# Patient Record
Sex: FEMALE | Race: WHITE | Employment: OTHER | URBAN - METROPOLITAN AREA
[De-identification: names, ages, dates, MRNs, and addresses within clinical notes are randomized per-mention and may not be internally consistent; named-entity substitution may affect disease eponyms.]

---

## 2018-04-20 ENCOUNTER — HOSPITAL ENCOUNTER (OUTPATIENT)
Facility: CLINIC | Age: 83
End: 2018-04-20
Attending: OPHTHALMOLOGY | Admitting: OPHTHALMOLOGY
Payer: MEDICARE

## 2018-04-20 RX ORDER — MOXIFLOXACIN 5 MG/ML
1 SOLUTION/ DROPS OPHTHALMIC
Status: CANCELLED | OUTPATIENT
Start: 2018-04-20

## 2018-04-20 RX ORDER — PHENYLEPHRINE HYDROCHLORIDE 25 MG/ML
1 SOLUTION/ DROPS OPHTHALMIC
Status: CANCELLED | OUTPATIENT
Start: 2018-04-20

## 2018-04-20 RX ORDER — PROPARACAINE HYDROCHLORIDE 5 MG/ML
1 SOLUTION/ DROPS OPHTHALMIC ONCE
Status: CANCELLED | OUTPATIENT
Start: 2018-04-20 | End: 2018-04-20

## 2018-04-20 RX ORDER — DICLOFENAC SODIUM 1 MG/ML
1 SOLUTION/ DROPS OPHTHALMIC
Status: CANCELLED | OUTPATIENT
Start: 2018-04-20

## 2018-04-20 RX ORDER — TROPICAMIDE 10 MG/ML
1 SOLUTION/ DROPS OPHTHALMIC
Status: CANCELLED | OUTPATIENT
Start: 2018-04-20

## 2018-04-24 ENCOUNTER — ANESTHESIA (OUTPATIENT)
Dept: SURGERY | Facility: CLINIC | Age: 83
End: 2018-04-24
Payer: MEDICARE

## 2018-04-24 ENCOUNTER — HOSPITAL ENCOUNTER (OUTPATIENT)
Facility: CLINIC | Age: 83
Discharge: HOME | End: 2018-04-24
Attending: OPHTHALMOLOGY | Admitting: OPHTHALMOLOGY
Payer: MEDICARE

## 2018-04-24 ENCOUNTER — ANESTHESIA EVENT (OUTPATIENT)
Dept: SURGERY | Facility: CLINIC | Age: 83
End: 2018-04-24
Payer: MEDICARE

## 2018-04-24 VITALS
OXYGEN SATURATION: 95 % | DIASTOLIC BLOOD PRESSURE: 85 MMHG | TEMPERATURE: 97.1 F | HEART RATE: 63 BPM | HEIGHT: 64 IN | RESPIRATION RATE: 16 BRPM | SYSTOLIC BLOOD PRESSURE: 115 MMHG | WEIGHT: 174.6 LBS | BODY MASS INDEX: 29.81 KG/M2

## 2018-04-24 LAB
ANION GAP SERPL CALCULATED.3IONS-SCNC: 9 MMOL/L (ref 3–14)
BUN SERPL-MCNC: 39 MG/DL (ref 7–30)
CALCIUM SERPL-MCNC: 9 MG/DL (ref 8.5–10.1)
CHLORIDE SERPL-SCNC: 106 MMOL/L (ref 94–109)
CO2 SERPL-SCNC: 25 MMOL/L (ref 20–32)
CREAT SERPL-MCNC: 2.24 MG/DL (ref 0.52–1.04)
GFR SERPL CREATININE-BSD FRML MDRD: 21 ML/MIN/1.7M2
GLUCOSE SERPL-MCNC: 108 MG/DL (ref 70–99)
POTASSIUM SERPL-SCNC: 4.8 MMOL/L (ref 3.4–5.3)
SODIUM SERPL-SCNC: 140 MMOL/L (ref 133–144)

## 2018-04-24 PROCEDURE — 36000102 ZZH EYE SURGERY LEVEL 3 EA 15 ADDTL MIN: Performed by: OPHTHALMOLOGY

## 2018-04-24 PROCEDURE — 37000009 ZZH ANESTHESIA TECHNICAL FEE, EACH ADDTL 15 MIN: Performed by: OPHTHALMOLOGY

## 2018-04-24 PROCEDURE — 40000170 ZZH STATISTIC PRE-PROCEDURE ASSESSMENT II: Performed by: OPHTHALMOLOGY

## 2018-04-24 PROCEDURE — 25000128 H RX IP 250 OP 636: Performed by: OPHTHALMOLOGY

## 2018-04-24 PROCEDURE — 80048 BASIC METABOLIC PNL TOTAL CA: CPT | Performed by: ANESTHESIOLOGY

## 2018-04-24 PROCEDURE — 25000125 ZZHC RX 250: Performed by: OPHTHALMOLOGY

## 2018-04-24 PROCEDURE — 25000125 ZZHC RX 250: Performed by: ANESTHESIOLOGY

## 2018-04-24 PROCEDURE — V2632 POST CHMBR INTRAOCULAR LENS: HCPCS | Performed by: OPHTHALMOLOGY

## 2018-04-24 PROCEDURE — 27210794 ZZH OR GENERAL SUPPLY STERILE: Performed by: OPHTHALMOLOGY

## 2018-04-24 PROCEDURE — 40000065 ZZH STATISTIC EKG NON-CHARGEABLE

## 2018-04-24 PROCEDURE — 93010 ELECTROCARDIOGRAM REPORT: CPT | Performed by: INTERNAL MEDICINE

## 2018-04-24 PROCEDURE — 25000128 H RX IP 250 OP 636: Performed by: NURSE ANESTHETIST, CERTIFIED REGISTERED

## 2018-04-24 PROCEDURE — 37000008 ZZH ANESTHESIA TECHNICAL FEE, 1ST 30 MIN: Performed by: OPHTHALMOLOGY

## 2018-04-24 PROCEDURE — 71000028 ZZH EYE RECOVERY PHASE 2 EACH 15 MINS: Performed by: OPHTHALMOLOGY

## 2018-04-24 PROCEDURE — 25000128 H RX IP 250 OP 636: Performed by: ANESTHESIOLOGY

## 2018-04-24 PROCEDURE — 36000101 ZZH EYE SURGERY LEVEL 3 1ST 30 MIN: Performed by: OPHTHALMOLOGY

## 2018-04-24 PROCEDURE — 93005 ELECTROCARDIOGRAM TRACING: CPT

## 2018-04-24 PROCEDURE — 36415 COLL VENOUS BLD VENIPUNCTURE: CPT | Performed by: ANESTHESIOLOGY

## 2018-04-24 DEVICE — EYE IMP IOL AMO PCL TECNIS ZCB00 22.0: Type: IMPLANTABLE DEVICE | Site: EYE | Status: FUNCTIONAL

## 2018-04-24 RX ORDER — ONDANSETRON 2 MG/ML
INJECTION INTRAMUSCULAR; INTRAVENOUS PRN
Status: DISCONTINUED | OUTPATIENT
Start: 2018-04-24 | End: 2018-04-24

## 2018-04-24 RX ORDER — DICLOFENAC SODIUM 1 MG/ML
1 SOLUTION/ DROPS OPHTHALMIC
Status: COMPLETED | OUTPATIENT
Start: 2018-04-24 | End: 2018-04-24

## 2018-04-24 RX ORDER — BALANCED SALT SOLUTION 6.4; .75; .48; .3; 3.9; 1.7 MG/ML; MG/ML; MG/ML; MG/ML; MG/ML; MG/ML
SOLUTION OPHTHALMIC PRN
Status: DISCONTINUED | OUTPATIENT
Start: 2018-04-24 | End: 2018-04-24 | Stop reason: HOSPADM

## 2018-04-24 RX ORDER — PHENYLEPHRINE HYDROCHLORIDE 25 MG/ML
1 SOLUTION/ DROPS OPHTHALMIC
Status: COMPLETED | OUTPATIENT
Start: 2018-04-24 | End: 2018-04-24

## 2018-04-24 RX ORDER — MOXIFLOXACIN 5 MG/ML
1 SOLUTION/ DROPS OPHTHALMIC
Status: COMPLETED | OUTPATIENT
Start: 2018-04-24 | End: 2018-04-24

## 2018-04-24 RX ORDER — PROPARACAINE HYDROCHLORIDE 5 MG/ML
1 SOLUTION/ DROPS OPHTHALMIC ONCE
Status: COMPLETED | OUTPATIENT
Start: 2018-04-24 | End: 2018-04-24

## 2018-04-24 RX ORDER — SODIUM CHLORIDE, SODIUM LACTATE, POTASSIUM CHLORIDE, CALCIUM CHLORIDE 600; 310; 30; 20 MG/100ML; MG/100ML; MG/100ML; MG/100ML
500 INJECTION, SOLUTION INTRAVENOUS CONTINUOUS
Status: DISCONTINUED | OUTPATIENT
Start: 2018-04-24 | End: 2018-04-24 | Stop reason: HOSPADM

## 2018-04-24 RX ORDER — TETRACAINE HYDROCHLORIDE 5 MG/ML
SOLUTION OPHTHALMIC PRN
Status: DISCONTINUED | OUTPATIENT
Start: 2018-04-24 | End: 2018-04-24 | Stop reason: HOSPADM

## 2018-04-24 RX ORDER — TIMOLOL 5 MG/ML
SOLUTION/ DROPS OPHTHALMIC PRN
Status: DISCONTINUED | OUTPATIENT
Start: 2018-04-24 | End: 2018-04-24 | Stop reason: HOSPADM

## 2018-04-24 RX ORDER — TROPICAMIDE 10 MG/ML
1 SOLUTION/ DROPS OPHTHALMIC
Status: COMPLETED | OUTPATIENT
Start: 2018-04-24 | End: 2018-04-24

## 2018-04-24 RX ORDER — PROPARACAINE HYDROCHLORIDE 5 MG/ML
1 SOLUTION/ DROPS OPHTHALMIC ONCE
Status: DISCONTINUED | OUTPATIENT
Start: 2018-04-24 | End: 2018-04-24 | Stop reason: HOSPADM

## 2018-04-24 RX ORDER — LIDOCAINE HYDROCHLORIDE 10 MG/ML
INJECTION, SOLUTION EPIDURAL; INFILTRATION; INTRACAUDAL; PERINEURAL PRN
Status: DISCONTINUED | OUTPATIENT
Start: 2018-04-24 | End: 2018-04-24 | Stop reason: HOSPADM

## 2018-04-24 RX ADMIN — Medication 1 DROP: at 07:39

## 2018-04-24 RX ADMIN — PHENYLEPHRINE HYDROCHLORIDE 1 DROP: 2.5 SOLUTION/ DROPS OPHTHALMIC at 07:39

## 2018-04-24 RX ADMIN — PHENYLEPHRINE HYDROCHLORIDE 1 DROP: 2.5 SOLUTION/ DROPS OPHTHALMIC at 07:53

## 2018-04-24 RX ADMIN — MOXIFLOXACIN 1 DROP: 5 SOLUTION/ DROPS OPHTHALMIC at 07:39

## 2018-04-24 RX ADMIN — PHENYLEPHRINE HYDROCHLORIDE 1 DROP: 2.5 SOLUTION/ DROPS OPHTHALMIC at 08:22

## 2018-04-24 RX ADMIN — MOXIFLOXACIN 1 DROP: 5 SOLUTION/ DROPS OPHTHALMIC at 08:22

## 2018-04-24 RX ADMIN — DICLOFENAC SODIUM 1 DROP: 1 SOLUTION OPHTHALMIC at 07:54

## 2018-04-24 RX ADMIN — TROPICAMIDE 1 DROP: 10 SOLUTION/ DROPS OPHTHALMIC at 08:22

## 2018-04-24 RX ADMIN — DICLOFENAC SODIUM 1 DROP: 1 SOLUTION OPHTHALMIC at 08:22

## 2018-04-24 RX ADMIN — ONDANSETRON 4 MG: 2 INJECTION INTRAMUSCULAR; INTRAVENOUS at 09:01

## 2018-04-24 RX ADMIN — SODIUM CHLORIDE, POTASSIUM CHLORIDE, SODIUM LACTATE AND CALCIUM CHLORIDE 500 ML: 600; 310; 30; 20 INJECTION, SOLUTION INTRAVENOUS at 08:22

## 2018-04-24 RX ADMIN — LIDOCAINE HYDROCHLORIDE 0.5 ML: 10 INJECTION, SOLUTION EPIDURAL; INFILTRATION; INTRACAUDAL; PERINEURAL at 08:24

## 2018-04-24 RX ADMIN — PROPARACAINE HYDROCHLORIDE 1 DROP: 5 SOLUTION/ DROPS OPHTHALMIC at 07:38

## 2018-04-24 RX ADMIN — TROPICAMIDE 1 DROP: 10 SOLUTION/ DROPS OPHTHALMIC at 07:53

## 2018-04-24 RX ADMIN — MOXIFLOXACIN 1 DROP: 5 SOLUTION/ DROPS OPHTHALMIC at 07:53

## 2018-04-24 RX ADMIN — MIDAZOLAM 1 MG: 1 INJECTION INTRAMUSCULAR; INTRAVENOUS at 09:01

## 2018-04-24 RX ADMIN — DICLOFENAC SODIUM 1 DROP: 1 SOLUTION OPHTHALMIC at 07:39

## 2018-04-24 RX ADMIN — TROPICAMIDE 1 DROP: 10 SOLUTION/ DROPS OPHTHALMIC at 07:39

## 2018-04-24 ASSESSMENT — LIFESTYLE VARIABLES: TOBACCO_USE: 0

## 2018-04-24 ASSESSMENT — COPD QUESTIONNAIRES: COPD: 0

## 2018-04-24 ASSESSMENT — ENCOUNTER SYMPTOMS
DYSRHYTHMIAS: 0
SEIZURES: 0

## 2018-04-24 NOTE — DISCHARGE INSTRUCTIONS
[facility] Discharge  Instructions  Anesthesia ([facility])   Adult Discharge Instructions    For 24 hours after surgery    1. Get plenty of rest.  Make arrangements to have a responsible adult stay with you for at least 6 hours after you leave the hospital.  2. Do not drive or use heavy equipment for 24 hours.    3. Do not drink alcohol for 24 hours.  4. Do not sign legal documents or make important decisions for 24 hours.  5. Avoid strenuous or risky activities. You may feel lightheaded.  If so, sit for a few minutes before standing.  Have someone help you get up.   6. Conscious sedation patients may resume a regular diet..  7. Any questions of medical nature, call your physician.    Post operative Instructions for Cataract Surgery  [facility]  Dr. Briceno  [phone]      ACTIVITY    We encourage you to rest today, and then gradually resume your normal activity over the next 7-10 days.    You may be a little a drowsy and have trouble with depth perception so be careful today.  Avoid lifting anything over 20-30 pounds for 2 weeks.  Avoid swimming and excess water in eyes for 2 weeks.  Avoid driving for 24 after hours after surgery.    DIET AND MEDICATION  Resume your normal diet and medication.  Avoid alcohol within 24 hours after surgery.  Take extra strength Tylenol for pain.  For severe pain not helped by Tylenol, please call the office immediately.  (Diabetics: We recommend that you check your blood sugars more frequently the day of surgery).    EYE CARE  It is normal to feel scratchiness like something is in the eye following surgery.  It is important to protect your eye against vigorous rubbing and to wear your shield while sleeping for I week.  You may take baths/showers the day after, but avoid getting water in the eye.        EYE DROPS     One drop 4 times day. Start today noon, evening, bedtimePost operative Instructions for Cataract Surgery  [facility]  Dr. Briceno  [phone]      ACTIVITY    We encourage you to rest today, and then gradually resume your normal activity over the next 7-10 days.    You may be a little a drowsy and have trouble with depth perception so be careful today.  Avoid lifting anything over 20-30 pounds for 2 weeks.  Avoid swimming and excess water in eyes for 2 weeks.  Avoid driving for 24 after hours after surgery.    DIET AND MEDICATION  Resume your normal diet and medication.  Avoid alcohol within 24 hours after surgery.  Take extra strength Tylenol for pain.  For severe pain not helped by Tylenol, please call the office immediately.  (Diabetics: We recommend that you check your blood sugars more frequently the day of surgery).    EYE CARE  It is normal to feel scratchiness like something is in the eye following surgery.  It is important to protect your eye against vigorous rubbing and to wear your shield while sleeping for I week.  You may take baths/showers the day after, but avoid getting water in the eye.        EYE DROPS    Drop Day of Surgery Week 1 Starting Day After Surgery Weeks 2-4 After Surgery   Vigamox One drop  every 2 hours  while awake One drop  3 times a day One drop  3 times a day  Stop after 10 days   Ilevro One drop  1 time a day One drop  1 time a day One drop  1 time a day  until gone   Durezol One drop  1 time a day One drop  1 time a day One drop  1 time a day  until gone   *Please wait at least 5 minutes between instilling each eye drop    IN CASE OF EMERGENCY  If you notice a decrease in vision, or an increase in pain or redness, please call [facility] [phone]. After business hours, this number will reach the answering service to page the ophthalmologist on call.          2/25/14    Drop Day of Surgery Week 1 Starting Day After Surgery Weeks 2-4 After Surgery                         IN CASE OF EMERGENCY  If you notice a decrease in vision, or an increase in pain or redness, please  call [facility] [phone]. After business hours, this number will reach the answering service to page the ophthalmologist on call.          2/25/14

## 2018-04-24 NOTE — IP AVS SNAPSHOT
[facility]    [address]    [place] [address]    Phone:  [phone]    Fax:  [phone]                                       After Visit Summary   4/24/2018    Martita Alonso    MRN: [id number]           After Visit Summary Signature Page     I have received my discharge instructions, and my questions have been answered. I have discussed any challenges I see with this plan with the nurse or doctor.    ..........................................................................................................................................  Patient/Patient Representative Signature      ..........................................................................................................................................  Patient Representative Print Name and Relationship to Patient    ..................................................               ................................................  Date                                            Time    ..........................................................................................................................................  Reviewed by Signature/Title    ...................................................              ..............................................  Date                                                            Time

## 2018-04-24 NOTE — ANESTHESIA CARE TRANSFER NOTE
Patient: Martita Alonso    Procedure(s):  RIGHT EYE PHACOEMULSIFICATION CLEAR CORNEA WITH STANDARD INTRAOCULAR LENS IMPLANT  - Wound Class: I-Clean    Diagnosis: NUCLEAR CATARACT RIGHT EYE  Diagnosis Additional Information: No value filed.    Anesthesia Type:   MAC     Note:  Airway :Room Air  Patient transferred to:PACU  Comments: Transferred to [facility] Center recovery room in recliner with armrests up, spontaneous respirations, O2 saturation maintained greater than 98% with oxygen via room air. All monitors and alarms on and functioning, clinically stable vital signs. Report given to recovery RN and questions answered. Patient alert and following verbal directions.Handoff Report: Identifed the Patient, Identified the Reponsible Provider, Reviewed the pertinent medical history, Discussed the surgical course, Reviewed Intra-OP anesthesia mangement and issues during anesthesia, Set expectations for post-procedure period and Allowed opportunity for questions and acknowledgement of understanding      Vitals: (Last set prior to Anesthesia Care Transfer)    CRNA VITALS  4/24/2018 0858 - 4/24/2018 0932      4/24/2018             Resp Rate (set): 10                Electronically Signed By: CARMEN Willson CRNA  April 24, 2018  9:32 AM

## 2018-04-24 NOTE — ANESTHESIA POSTPROCEDURE EVALUATION
Patient: Martita Alonso    Procedure(s):  RIGHT EYE PHACOEMULSIFICATION CLEAR CORNEA WITH STANDARD INTRAOCULAR LENS IMPLANT  - Wound Class: I-Clean    Diagnosis:NUCLEAR CATARACT RIGHT EYE  Diagnosis Additional Information: No value filed.    Anesthesia Type:  MAC    Note:  Anesthesia Post Evaluation    Patient location during evaluation: PACU  Patient participation: Able to fully participate in evaluation  Level of consciousness: awake  Pain management: adequate  Airway patency: patent  Cardiovascular status: acceptable  Respiratory status: acceptable  Hydration status: acceptable  PONV: none     Anesthetic complications: None          Last vitals:  Vitals:    04/24/18 0805 04/24/18 0932 04/24/18 0948   BP: 125/58 123/54 115/85   Pulse: 63     Resp:  16 16   Temp: 36.2  C (97.1  F)     SpO2: 100% 96% 95%         Electronically Signed By: Tal Rai MD  April 24, 2018  2:05 PM

## 2018-04-24 NOTE — OP NOTE
Procedure Date: 04/24/2018      DATE OF PROCEDURE:   04/24/2018        PREOPERATIVE DIAGNOSIS:  Cataract:  Nuclear sclerosis, posterior subcapsular, cortical right eye.         POSTOPERATIVE DIAGNOSIS:  Cataract:  Nuclear sclerosis, posterior subcapsular, cortical right eye.        OPERATIVE PROCEDURE:  Phaco with IOL, right eye.         ANESTHESIA: Topical with MAC      LENS INFORMATION:  ZCB00 22.0 diopters.  Serial #[id number].           INDICATIONS FOR PROCEDURE:  A complete ophthalmic evaluation showed that the cataract was a significant cause of the decreased visual acuity.   The risks of cataract surgery including loss of vision, loss of the eye, hemorrhage, infection and other rare but possible side effects of surgery were discussed with the patient. The patient understood the risks and elected to proceed with surgery.       DESCRIPTION:  The patient's operative eye was dilated per the phacoemulsification standing orders.  The operative eye was anesthetized with 2% Lidocaine jelly 5 to 10 minutes before the surgical prep. The patient was then brought to the operating room where the operative eye was prepped and draped in the usual sterile fashion and a lid speculum was placed.  A grooved clear corneal incision was made at the temporal limbus of the eye.  This was 2.5 mm in length, 1/3 corneal depth.  A paracentesis was made at the 12 o'clock position.  The anterior chamber was entered though the initial corneal groove with a keratome. Viscoelastic was used to deepen the anterior chamber.  A capsulotomy was performed with the cystotome employing the capsulorrhexis technique.  BSS was used to hydrodissect and hydrodelineate the nucleus.  The nucleus was then carefully emulsified using the crack and fracture technique.  The remaining nuclear fragments were carefully emulsified and aspirated.  The remaining cortex was carefully aspirated from the posterior capsular bag with the I and A handpiece.        Viscoelastic was used to re-inflate the posterior capsular bag and a lens was carefully inserted through the incision and deposited into the bag with the haptics clearly within the bag.  The style and diopter of lens is listed on the implant label below.  Remaining viscoelastic was carefully aspirated from the anterior chamber.  The wound was closed by hydrating the cornea with BSS.  The wound was found to be water tight.  One drop each of  Vigamox and Combigan was given at the conclusion of the procedure.  There were no complications.  The patient tolerated the procedure well.         MARLENE RAMACHANDRAN MD             D: 2018   T: 2018   MT: LATRICIA      Name:     CINDY MARIE   MRN:      [id number]        Account:        [id number]   :      1935           Procedure Date: 2018      Document: [id number]

## 2018-04-24 NOTE — IP AVS SNAPSHOT
MRN:[id number]                      After Visit Summary   4/24/2018    Martita Alonso    MRN: [id number]           Thank you!     Thank you for choosing [facility] for your care. Our goal is always to provide you with excellent care. Hearing back from our patients is one way we can continue to improve our services. Please take a few minutes to complete the written survey that you may receive in the mail after you visit with us. Thank you!        Patient Information     Date Of Birth          1935        About your hospital stay     You were admitted on:  April 24, 2018 You last received care in the:  [facility]    You were discharged on:  April 24, 2018       Who to Call     For medical emergencies, please call 911.  For non-urgent questions about your medical care, please call your primary care provider or clinic, [phone]  For questions related to your surgery, please call your surgery clinic        Attending Provider     Provider Specialty    Bello Briceno MD Ophthalmology       Primary Care Provider Office Phone # Fax #    Fabio Galdamez [phone]      Your next 10 appointments already scheduled     May 22, 2018   Procedure with Bello Briceno MD   [facility] (--)    [address] [place] [address] [place], [address]  [facility] [address] [phone]              Further instructions from your care team       [facility] Discharge  Instructions  Anesthesia ([facility])   Adult Discharge Instructions    For 24 hours after surgery    1. Get plenty of rest.  Make arrangements to have a responsible adult stay with you for at least 6 hours after you leave the hospital.  2. Do not drive or use heavy equipment for 24 hours.    3. Do not drink alcohol for 24 hours.  4. Do not sign legal documents or make important decisions for 24 hours.  5. Avoid strenuous or risky activities. You may feel  lightheaded.  If so, sit for a few minutes before standing.  Have someone help you get up.   6. Conscious sedation patients may resume a regular diet..  7. Any questions of medical nature, call your physician.    Post operative Instructions for Cataract Surgery  [facility]  Dr. Briceno  [phone]      ACTIVITY    We encourage you to rest today, and then gradually resume your normal activity over the next 7-10 days.    You may be a little a drowsy and have trouble with depth perception so be careful today.  Avoid lifting anything over 20-30 pounds for 2 weeks.  Avoid swimming and excess water in eyes for 2 weeks.  Avoid driving for 24 after hours after surgery.    DIET AND MEDICATION  Resume your normal diet and medication.  Avoid alcohol within 24 hours after surgery.  Take extra strength Tylenol for pain.  For severe pain not helped by Tylenol, please call the office immediately.  (Diabetics: We recommend that you check your blood sugars more frequently the day of surgery).    EYE CARE  It is normal to feel scratchiness like something is in the eye following surgery.  It is important to protect your eye against vigorous rubbing and to wear your shield while sleeping for I week.  You may take baths/showers the day after, but avoid getting water in the eye.        EYE DROPS     One drop 4 times day. Start today noon, evening, bedtimePost operative Instructions for Cataract Surgery  [facility]  Dr. Briceno  [phone]      ACTIVITY    We encourage you to rest today, and then gradually resume your normal activity over the next 7-10 days.    You may be a little a drowsy and have trouble with depth perception so be careful today.  Avoid lifting anything over 20-30 pounds for 2 weeks.  Avoid swimming and excess water in eyes for 2 weeks.  Avoid driving for 24 after hours after surgery.    DIET AND MEDICATION  Resume your normal diet and medication.  Avoid alcohol within 24 hours after surgery.  Take  extra strength Tylenol for pain.  For severe pain not helped by Tylenol, please call the office immediately.  (Diabetics: We recommend that you check your blood sugars more frequently the day of surgery).    EYE CARE  It is normal to feel scratchiness like something is in the eye following surgery.  It is important to protect your eye against vigorous rubbing and to wear your shield while sleeping for I week.  You may take baths/showers the day after, but avoid getting water in the eye.        EYE DROPS    Drop Day of Surgery Week 1 Starting Day After Surgery Weeks 2-4 After Surgery   Vigamox One drop  every 2 hours  while awake One drop  3 times a day One drop  3 times a day  Stop after 10 days   Ilevro One drop  1 time a day One drop  1 time a day One drop  1 time a day  until gone   Durezol One drop  1 time a day One drop  1 time a day One drop  1 time a day  until gone   *Please wait at least 5 minutes between instilling each eye drop    IN CASE OF EMERGENCY  If you notice a decrease in vision, or an increase in pain or redness, please call [facility] [phone]. After business hours, this number will reach the answering service to page the ophthalmologist on call.          2/25/14    Drop Day of Surgery Week 1 Starting Day After Surgery Weeks 2-4 After Surgery                         IN CASE OF EMERGENCY  If you notice a decrease in vision, or an increase in pain or redness, please call [facility] [phone]. After business hours, this number will reach the answering service to page the ophthalmologist on call.          2/25/14    Pending Results     Date and Time Order Name Status Description    4/24/2018 0753 EKG 12-lead, tracing only Preliminary             Admission Information     Date & Time Provider Department Dept. Phone    4/24/2018 Bello Briceno MD [facility] [phone]      Your Vitals Were     Blood Pressure Pulse Temperature Respirations  "Height Weight    123/54 63 97.1  F (36.2  C) (Temporal) 16 1.626 m (5' 4\") 79.2 kg (174 lb 9.6 oz)    Pulse Oximetry BMI (Body Mass Index)                96% 29.97 kg/m2          v[employer] Information     [employer] lets you send messages to your doctor, view your test results, renew your prescriptions, schedule appointments and more. To sign up, go to [email or web address] . Click on \"Log in\" on the left side of the screen, which will take you to the Welcome page. Then click on \"Sign up Now\" on the right side of the page.     You will be asked to enter the access code listed below, as well as some personal information. Please follow the directions to create your username and password.     Your access code is: [email or web address]  Expires: 2018  9:42 AM     Your access code will  in 90 days. If you need help or a new code, please call your [facility] clinic or [phone].        Care EveryWhere ID     This is your Care EveryWhere ID. This could be used by other organizations to access your [facility] medical records  [id number]        Equal Access to Services     MICHELA OZUNA AH: Jewels Garcia, waalma bell, qarajanta kaalmada ademaria eugeniada, delmi pearson. So [facility] [place] [facility] [phone].    ATENCIÓN: Si habla español, tiene a mathis disposición servicios gratuitos de asistencia lingüística. Llame al [phone].    We comply with applicable federal civil rights laws and [place] laws. We do not discriminate on the basis of race, color, national origin, age, disability, sex, sexual orientation, or gender identity.               Review of your medicines      UNREVIEWED medicines. Ask your doctor about these medicines        Dose / Directions    METOPROLOL TARTRATE PO        Dose:  50 mg   Take 50 mg by mouth 2 times daily   Refills:  0       NORVASC PO        Dose:  5 mg   Take 5 mg by mouth daily   Refills:  0                Protect others around you: Learn how to safely use, store and " throw away your medicines at www.disposemymeds.org.             Medication List: This is a list of all your medications and when to take them. Check marks below indicate your daily home schedule. Keep this list as a reference.      Medications           Morning Afternoon Evening Bedtime As Needed    METOPROLOL TARTRATE PO   Take 50 mg by mouth 2 times daily                                NORVASC PO   Take 5 mg by mouth daily

## 2018-04-24 NOTE — ANESTHESIA PREPROCEDURE EVALUATION
Procedure: Procedure(s):  PHACOEMULSIFICATION CLEAR CORNEA WITH STANDARD INTRAOCULAR LENS IMPLANT  Preop diagnosis: NUCLEAR CATARACT RIGHT EYE    Allergies   Allergen Reactions     Contrast Dye Swelling     Valium [Diazepam] Swelling     Swollen face       Past Medical History:   Diagnosis Date     Anxiety      Arthritis      Breast cancer (H)      Colon cancer (H)      Dyslipidemia      Hypertension      Vitamin D deficiency      Past Surgical History:   Procedure Laterality Date     BREAST SURGERY       GENITOURINARY SURGERY       GI SURGERY       GYN SURGERY       ORTHOPEDIC SURGERY       Social History   Substance Use Topics     Smoking status: Never Smoker     Smokeless tobacco: Never Used     Alcohol use Yes     Prior to Admission medications    Medication Sig Start Date End Date Taking? Authorizing Provider   AmLODIPine Besylate (NORVASC PO) Take 5 mg by mouth daily   Yes Reported, Patient   METOPROLOL TARTRATE PO Take 50 mg by mouth 2 times daily   Yes Reported, Patient     Current Facility-Administered Medications Ordered in Epic   Medication Dose Route Frequency Last Rate Last Dose     diclofenac (VOLTAREN) 0.1 % ophthalmic solution 1 drop  1 drop Ophthalmic q5 Min Prior to Surgery   1 drop at 04/24/18 0754     lactated ringers infusion  500 mL Intravenous Continuous         lidocaine (AKTEN) ophthalmic gel 0.5 mL  0.5 mL Ophthalmic Once         lidocaine 1 % 1 mL  1 mL Other Q1H PRN         moxifloxacin (VIGAMOX) 0.5 % ophthalmic solution 1 drop  1 drop Ophthalmic q5 Min Prior to Surgery   1 drop at 04/24/18 0753     phenylephrine (MYDFRIN /JOHANNE-SYNEPHRINE) 2.5 % ophthalmic solution 1 drop  1 drop Ophthalmic q5 Min Prior to Surgery   1 drop at 04/24/18 0753     proparacaine (ALCAINE) 0.5 % ophthalmic solution 1 drop  1 drop Ophthalmic Once         tropicamide (MYDRIACYL) 1 % ophthalmic solution 1 drop  1 drop Ophthalmic q5 Min Prior to Surgery   1 drop at 04/24/18 0753     No current Epic-ordered  outpatient prescriptions on file.       lactated ringers       Wt Readings from Last 1 Encounters:   04/24/18 79.2 kg (174 lb 9.6 oz)     Temp Readings from Last 1 Encounters:   04/24/18 36.2  C (97.1  F) (Temporal)     BP Readings from Last 6 Encounters:   04/24/18 125/58     Pulse Readings from Last 4 Encounters:   04/24/18 63     Resp Readings from Last 1 Encounters:   No data found for Resp     SpO2 Readings from Last 1 Encounters:   04/24/18 100%     RECENT LABS:     ECG: Ventricular bigeminy; similar to preop ECG    ECHO:     Anesthesia Evaluation     . Pt has had prior anesthetic. Type: MAC and General    No history of anesthetic complications          ROS/MED HX    ENT/Pulmonary:      (-) tobacco use, asthma, COPD and sleep apnea   Neurologic:      (-) seizures and CVA   Cardiovascular: Comment: Ventricular Bigeminy     (+) hypertension----. : . . . :. .      (-) syncope, arrhythmias, irregular heartbeat/palpitations and valvular problems/murmurs   METS/Exercise Tolerance:     Hematologic:     (+) Other Hematologic Disorder-MGUS      Musculoskeletal:         GI/Hepatic:        (-) GERD and liver disease   Renal/Genitourinary:     (+) chronic renal disease, type: CRI,       Endo:      (-) Type II DM, thyroid disease and chronic steroid usage   Psychiatric:         Infectious Disease:         Malignancy:   (+) Malignancy History of Breast          Other:                     Physical Exam      Airway   Mallampati: II  TM distance: >3 FB  Neck ROM: full    Dental   (+) caps    Cardiovascular   Rhythm and rate: irregular and normal  (-) no murmur    Pulmonary    breath sounds clear to auscultation(-) no wheezes                    Anesthesia Plan      History & Physical Review  History and physical reviewed and following examination; no interval change.    ASA Status:  2 .    NPO Status:  > 8 hours    Plan for MAC Reason for MAC:  Procedure to face, neck, head or breast  PONV prophylaxis:  Ondansetron (or other  5HT-3)       Postoperative Care  Postoperative pain management:  Multi-modal analgesia.      Consents  Anesthetic plan, risks, benefits and alternatives discussed with:  Patient..

## 2018-04-25 LAB — INTERPRETATION ECG - MUSE: NORMAL

## 2018-12-14 ENCOUNTER — MEDICAL CORRESPONDENCE (OUTPATIENT)
Dept: HEALTH INFORMATION MANAGEMENT | Facility: CLINIC | Age: 83
End: 2018-12-14

## 2018-12-17 RX ORDER — ASPIRIN 81 MG/1
81 TABLET ORAL DAILY
COMMUNITY

## 2018-12-17 RX ORDER — PAROXETINE 40 MG/1
TABLET, FILM COATED ORAL EVERY MORNING
COMMUNITY

## 2018-12-17 RX ORDER — DILTIAZEM HYDROCHLORIDE 240 MG/1
CAPSULE, EXTENDED RELEASE ORAL DAILY
COMMUNITY

## 2018-12-17 RX ORDER — METOPROLOL SUCCINATE 50 MG/1
50 TABLET, EXTENDED RELEASE ORAL DAILY
COMMUNITY

## 2018-12-17 RX ORDER — POTASSIUM CHLORIDE 1.5 G/1.58G
20 POWDER, FOR SOLUTION ORAL 2 TIMES DAILY
COMMUNITY

## 2018-12-17 RX ORDER — MIRTAZAPINE 7.5 MG/1
7.5 TABLET, FILM COATED ORAL AT BEDTIME
COMMUNITY

## 2018-12-17 RX ORDER — PREDNISONE 50 MG/1
60 TABLET ORAL DAILY
COMMUNITY

## 2018-12-17 RX ORDER — ONDANSETRON 4 MG/1
4 TABLET, ORALLY DISINTEGRATING ORAL EVERY 8 HOURS PRN
COMMUNITY

## 2018-12-17 RX ORDER — METHYLPHENIDATE 1.1 MG/H
1 PATCH TRANSDERMAL DAILY
COMMUNITY

## 2018-12-17 RX ORDER — FUROSEMIDE 80 MG
80 TABLET ORAL DAILY
COMMUNITY

## 2018-12-17 RX ORDER — OMEPRAZOLE 40 MG/1
40 CAPSULE, DELAYED RELEASE ORAL DAILY
COMMUNITY

## 2018-12-17 NOTE — OR NURSING
Instructions called to Nursing home  Marie and pt daughter in law listed in chart with pre-op instructions.       Marie stated that pt daughter in law is POA.  Message left for Desiree Alonso to bring POA paperwork day of surgery.

## 2018-12-18 ENCOUNTER — HOSPITAL ENCOUNTER (OUTPATIENT)
Facility: CLINIC | Age: 83
Discharge: HOME | End: 2018-12-18
Attending: OPHTHALMOLOGY | Admitting: OPHTHALMOLOGY
Payer: MEDICARE

## 2018-12-18 ENCOUNTER — ANESTHESIA EVENT (OUTPATIENT)
Dept: SURGERY | Facility: CLINIC | Age: 83
End: 2018-12-18
Payer: MEDICARE

## 2018-12-18 ENCOUNTER — ANESTHESIA (OUTPATIENT)
Dept: SURGERY | Facility: CLINIC | Age: 83
End: 2018-12-18
Payer: MEDICARE

## 2018-12-18 VITALS
DIASTOLIC BLOOD PRESSURE: 93 MMHG | SYSTOLIC BLOOD PRESSURE: 142 MMHG | OXYGEN SATURATION: 97 % | RESPIRATION RATE: 16 BRPM | TEMPERATURE: 97.7 F

## 2018-12-18 PROCEDURE — 37000008 ZZH ANESTHESIA TECHNICAL FEE, 1ST 30 MIN: Performed by: OPHTHALMOLOGY

## 2018-12-18 PROCEDURE — 25000125 ZZHC RX 250: Performed by: OPHTHALMOLOGY

## 2018-12-18 PROCEDURE — 37000009 ZZH ANESTHESIA TECHNICAL FEE, EACH ADDTL 15 MIN: Performed by: OPHTHALMOLOGY

## 2018-12-18 PROCEDURE — 25000128 H RX IP 250 OP 636: Performed by: ANESTHESIOLOGY

## 2018-12-18 PROCEDURE — 27210794 ZZH OR GENERAL SUPPLY STERILE: Performed by: OPHTHALMOLOGY

## 2018-12-18 PROCEDURE — 25000128 H RX IP 250 OP 636: Performed by: NURSE ANESTHETIST, CERTIFIED REGISTERED

## 2018-12-18 PROCEDURE — 71000028 ZZH EYE RECOVERY PHASE 2 EACH 15 MINS: Performed by: OPHTHALMOLOGY

## 2018-12-18 PROCEDURE — 25000128 H RX IP 250 OP 636: Performed by: OPHTHALMOLOGY

## 2018-12-18 PROCEDURE — V2632 POST CHMBR INTRAOCULAR LENS: HCPCS | Performed by: OPHTHALMOLOGY

## 2018-12-18 PROCEDURE — 36000101 ZZH EYE SURGERY LEVEL 3 1ST 30 MIN: Performed by: OPHTHALMOLOGY

## 2018-12-18 PROCEDURE — 36000102 ZZH EYE SURGERY LEVEL 3 EA 15 ADDTL MIN: Performed by: OPHTHALMOLOGY

## 2018-12-18 PROCEDURE — 40000170 ZZH STATISTIC PRE-PROCEDURE ASSESSMENT II: Performed by: OPHTHALMOLOGY

## 2018-12-18 PROCEDURE — A9270 NON-COVERED ITEM OR SERVICE: HCPCS | Performed by: OPHTHALMOLOGY

## 2018-12-18 DEVICE — EYE IMP IOL AMO PCL TECNIS ZCB00 21.0: Type: IMPLANTABLE DEVICE | Site: EYE | Status: FUNCTIONAL

## 2018-12-18 RX ORDER — PHENYLEPHRINE HYDROCHLORIDE 25 MG/ML
1 SOLUTION/ DROPS OPHTHALMIC
Status: COMPLETED | OUTPATIENT
Start: 2018-12-18 | End: 2018-12-18

## 2018-12-18 RX ORDER — TROPICAMIDE 10 MG/ML
1 SOLUTION/ DROPS OPHTHALMIC
Status: COMPLETED | OUTPATIENT
Start: 2018-12-18 | End: 2018-12-18

## 2018-12-18 RX ORDER — PROPARACAINE HYDROCHLORIDE 5 MG/ML
1 SOLUTION/ DROPS OPHTHALMIC ONCE
Status: DISCONTINUED | OUTPATIENT
Start: 2018-12-18 | End: 2018-12-18 | Stop reason: HOSPADM

## 2018-12-18 RX ORDER — MOXIFLOXACIN 5 MG/ML
1 SOLUTION/ DROPS OPHTHALMIC
Status: COMPLETED | OUTPATIENT
Start: 2018-12-18 | End: 2018-12-18

## 2018-12-18 RX ORDER — SODIUM CHLORIDE, SODIUM LACTATE, POTASSIUM CHLORIDE, CALCIUM CHLORIDE 600; 310; 30; 20 MG/100ML; MG/100ML; MG/100ML; MG/100ML
500 INJECTION, SOLUTION INTRAVENOUS CONTINUOUS
Status: DISCONTINUED | OUTPATIENT
Start: 2018-12-18 | End: 2018-12-18 | Stop reason: HOSPADM

## 2018-12-18 RX ORDER — ALBUTEROL SULFATE 90 UG/1
2 AEROSOL, METERED RESPIRATORY (INHALATION) EVERY 6 HOURS
COMMUNITY

## 2018-12-18 RX ORDER — LIDOCAINE HYDROCHLORIDE 10 MG/ML
INJECTION, SOLUTION EPIDURAL; INFILTRATION; INTRACAUDAL; PERINEURAL PRN
Status: DISCONTINUED | OUTPATIENT
Start: 2018-12-18 | End: 2018-12-18 | Stop reason: HOSPADM

## 2018-12-18 RX ORDER — ONDANSETRON 2 MG/ML
INJECTION INTRAMUSCULAR; INTRAVENOUS PRN
Status: DISCONTINUED | OUTPATIENT
Start: 2018-12-18 | End: 2018-12-18

## 2018-12-18 RX ORDER — DICLOFENAC SODIUM 1 MG/ML
1 SOLUTION/ DROPS OPHTHALMIC
Status: COMPLETED | OUTPATIENT
Start: 2018-12-18 | End: 2018-12-18

## 2018-12-18 RX ORDER — BALANCED SALT SOLUTION 6.4; .75; .48; .3; 3.9; 1.7 MG/ML; MG/ML; MG/ML; MG/ML; MG/ML; MG/ML
SOLUTION OPHTHALMIC PRN
Status: DISCONTINUED | OUTPATIENT
Start: 2018-12-18 | End: 2018-12-18 | Stop reason: HOSPADM

## 2018-12-18 RX ORDER — LIDOCAINE 40 MG/G
CREAM TOPICAL
Status: DISCONTINUED | OUTPATIENT
Start: 2018-12-18 | End: 2018-12-18 | Stop reason: HOSPADM

## 2018-12-18 RX ORDER — TIMOLOL 5 MG/ML
SOLUTION/ DROPS OPHTHALMIC PRN
Status: DISCONTINUED | OUTPATIENT
Start: 2018-12-18 | End: 2018-12-18 | Stop reason: HOSPADM

## 2018-12-18 RX ADMIN — MOXIFLOXACIN 1 DROP: 5 SOLUTION/ DROPS OPHTHALMIC at 10:52

## 2018-12-18 RX ADMIN — PHENYLEPHRINE HYDROCHLORIDE 1 DROP: 2.5 SOLUTION/ DROPS OPHTHALMIC at 10:52

## 2018-12-18 RX ADMIN — DICLOFENAC SODIUM 1 DROP: 1 SOLUTION OPHTHALMIC at 11:06

## 2018-12-18 RX ADMIN — SODIUM CHLORIDE, SODIUM LACTATE, POTASSIUM CHLORIDE, CALCIUM CHLORIDE: 600; 310; 30; 20 INJECTION, SOLUTION INTRAVENOUS at 11:18

## 2018-12-18 RX ADMIN — MOXIFLOXACIN 1 DROP: 5 SOLUTION/ DROPS OPHTHALMIC at 11:01

## 2018-12-18 RX ADMIN — MOXIFLOXACIN 1 DROP: 5 SOLUTION/ DROPS OPHTHALMIC at 11:06

## 2018-12-18 RX ADMIN — DICLOFENAC SODIUM 1 DROP: 1 SOLUTION OPHTHALMIC at 10:52

## 2018-12-18 RX ADMIN — SODIUM CHLORIDE, POTASSIUM CHLORIDE, SODIUM LACTATE AND CALCIUM CHLORIDE 500 ML: 600; 310; 30; 20 INJECTION, SOLUTION INTRAVENOUS at 11:06

## 2018-12-18 RX ADMIN — TROPICAMIDE 1 DROP: 10 SOLUTION/ DROPS OPHTHALMIC at 11:01

## 2018-12-18 RX ADMIN — PHENYLEPHRINE HYDROCHLORIDE 1 DROP: 2.5 SOLUTION/ DROPS OPHTHALMIC at 11:01

## 2018-12-18 RX ADMIN — DICLOFENAC SODIUM 1 DROP: 1 SOLUTION OPHTHALMIC at 11:01

## 2018-12-18 RX ADMIN — ONDANSETRON 4 MG: 2 INJECTION INTRAMUSCULAR; INTRAVENOUS at 11:15

## 2018-12-18 RX ADMIN — MIDAZOLAM 1 MG: 1 INJECTION INTRAMUSCULAR; INTRAVENOUS at 11:15

## 2018-12-18 RX ADMIN — PHENYLEPHRINE HYDROCHLORIDE 1 DROP: 2.5 SOLUTION/ DROPS OPHTHALMIC at 11:06

## 2018-12-18 RX ADMIN — TROPICAMIDE 1 DROP: 10 SOLUTION/ DROPS OPHTHALMIC at 10:52

## 2018-12-18 RX ADMIN — TROPICAMIDE 1 DROP: 10 SOLUTION/ DROPS OPHTHALMIC at 11:06

## 2018-12-18 ASSESSMENT — ENCOUNTER SYMPTOMS
SEIZURES: 0
DYSRHYTHMIAS: 0

## 2018-12-18 ASSESSMENT — COPD QUESTIONNAIRES: COPD: 0

## 2018-12-18 ASSESSMENT — LIFESTYLE VARIABLES: TOBACCO_USE: 0

## 2018-12-18 NOTE — ANESTHESIA POSTPROCEDURE EVALUATION
Patient: Martita Alonso    Procedure(s):  LEFT EYE PHACOEMULSIFICATION CLEAR CORNEA WITH STANDARD INTRAOCULAR LENS IMPLANT    Diagnosis:CATARACT LEFT EYE  Diagnosis Additional Information: No value filed.    Anesthesia Type:  MAC    Note:  Anesthesia Post Evaluation    Patient location during evaluation: PACU  Patient participation: Able to fully participate in evaluation  Level of consciousness: awake and alert  Pain management: satisfactory to patient  Airway patency: patent  Cardiovascular status: hemodynamically stable  Respiratory status: acceptable and unassisted  Hydration status: balanced  PONV: none     Anesthetic complications: None          Last vitals:  Vitals:    12/18/18 1059 12/18/18 1154 12/18/18 1215   BP: (!) 188/104 127/68 (!) 142/93   Resp: 16 16 16   Temp: 36.5  C (97.7  F)     SpO2: 100% 96% 97%         Electronically Signed By: Italo Madsen MD  December 18, 2018  1:29 PM

## 2018-12-18 NOTE — DISCHARGE INSTRUCTIONS
[facility]  Instructions  Anesthesia ([facility])   Adult Discharge Instructions    For 24 hours after surgery    1. Get plenty of rest.  Make arrangements to have a responsible adult stay with you for at least 24 hours after you leave the hospital.  2. Do not drive or use heavy equipment for 24 hours.    3. Do not drink alcohol for 24 hours.  4. Do not sign legal documents or make important decisions for 24 hours.  5. Avoid strenuous or risky activities. You may feel lightheaded.  If so, sit for a few minutes before standing.  Have someone help you get up.   6. Conscious sedation patients may resume a regular diet..         7.  Any questions of medical nature, call your physician.    Post operative Instructions for Cataract Surgery  [facility]  Dr. Briceno  [phone]      ACTIVITY    We encourage you to rest today, and then gradually resume your normal activity over the next 7-10 days.    You may be a little a drowsy and have trouble with depth perception so be careful today.  Avoid lifting anything over 20-30 pounds for 2 weeks.  Avoid swimming and excess water in eyes for 2 weeks.  Avoid driving for 24 after hours after surgery.    DIET AND MEDICATION  Resume your normal diet and medication.  Avoid alcohol within 24 hours after surgery.  Take ibuprofen for pain.  For severe pain not helped by Ibuprofen, please call the office immediately.  (Diabetics: We recommend that you check your blood sugars more frequently the day of surgery).    EYE CARE  It is normal to feel scratchiness like something is in the eye following surgery.  It is important to protect your eye against vigorous rubbing and to wear your shield while sleeping for I week.  Wear the eye shield the day of surgery and then 1 week at night while sleeping.  You may take baths/showers the day after, but avoid getting water in the eye.   Avoid bending over.       EYE DROPS  Cataractive3  should be instilled into the operative eye 4 times daily for 14 days.      IN CASE OF EMERGENCY  If you notice a decrease in vision, or an increase in pain or redness, please call [facility] [phone]. After business hours, this number will reach the answering service to page the ophthalmologist on call.          4/26/16

## 2018-12-18 NOTE — ANESTHESIA CARE TRANSFER NOTE
Patient: Martita Alonso    Procedure(s):  LEFT EYE PHACOEMULSIFICATION CLEAR CORNEA WITH STANDARD INTRAOCULAR LENS IMPLANT    Diagnosis: CATARACT LEFT EYE  Diagnosis Additional Information: No value filed.    Anesthesia Type:   MAC     Note:  Airway :Room Air  Patient transferred to:PACU  Comments: Transferred to EC  Recovery awake doing fine.  Report to [place]  RNHandoff Report: Identifed the Patient, Identified the Reponsible Provider, Reviewed the pertinent medical history, Discussed the surgical course, Reviewed Intra-OP anesthesia mangement and issues during anesthesia, Set expectations for post-procedure period and Allowed opportunity for questions and acknowledgement of understanding      Vitals: (Last set prior to Anesthesia Care Transfer)    CRNA VITALS  12/18/2018 1120 - 12/18/2018 1153      12/18/2018             Resp Rate (set):  10                Electronically Signed By: CARMEN Ramsay CRNA  December 18, 2018  11:53 AM

## 2018-12-19 NOTE — OP NOTE
Procedure Date: 12/18/2018      PREOPERATIVE DIAGNOSIS:  Cataract:  Nuclear sclerosis, posterior subcapsular, cortical left eye.         POSTOPERATIVE DIAGNOSIS:  Cataract:  Nuclear sclerosis, posterior subcapsular, cortical left eye.        OPERATIVE PROCEDURE:  Phaco with IOL, ZCB00, 21.0, serial number [id number], left eye       ANESTHESIA: Topical with MAC       INDICATIONS FOR PROCEDURE:  A complete ophthalmic evaluation showed that the cataract was a significant cause of the decreased visual acuity.   The risks of cataract surgery including loss of vision, loss of the eye, hemorrhage, infection and other rare but possible side effects of surgery were discussed with the patient. The patient understood the risks and elected to proceed with surgery.       DESCRIPTION:  The patient's operative eye was dilated per the phacoemulsification standing orders.  The operative eye was anesthetized with 2% Lidocaine jelly 5 to 10 minutes before the surgical prep. The patient was then brought to the operating room where the operative eye was prepped and draped in the usual sterile fashion and a lid speculum was placed.  A grooved clear corneal incision was made at the temporal limbus of the eye.  This was 2.5mm in length, 1/3 corneal depth.  A paracentesis was made at the 12 o'clock position.  The anterior chamber was entered though the initial corneal groove with a keratome. Viscoelastic was used to deepen the anterior chamber.  A capsulotomy was performed with the cystotome employing the capsulorrhexis technique.  BSS was used to hydrodissect and hydrodelineate the nucleus.  The nucleus was then carefully emulsified using the crack and fracture technique.  The remaining nuclear fragments were carefully emulsified and aspirated.  The remaining cortex was carefully aspirated from the posterior capsular bag with the I&A handpiece.       Viscoelastic was used to re-inflate the posterior capsular bag and a lens was carefully  inserted through the incision and deposited into the bag with the haptics clearly within the bag.  The style and diopter of lens is listed on the implant label below.  Remaining viscoelastic was carefully aspirated from the anterior chamber.  The wound was closed by hydrating the cornea with BSS.  The wound was found to be water tight.  One drop each of Vigamox and Combigan was given at the conclusion of the procedure.  There were no complications.  The patient tolerated the procedure well.               The actual 0.0 4.0, left eye.   1.  6513 is [place] and 12 correction.      DATE OF SERVICE 2018 this is standard alden.         MARLENE RAMACHANDRAN MD             D: 2018   T: 2018   MT: SAHIL      Name:     CINDY MRAIE   MRN:      [id number]        Account:        [id number]   :      1935           Procedure Date: 2018      Document: [id number]

## (undated) DEVICE — GLOVE PROTEXIS MICRO 8.0  2D73PM80

## (undated) DEVICE — PACK CATARACT CUSTOM SO DALE SEY32CTFCX

## (undated) DEVICE — EYE SOL BSS 15ML BOTTLE 65079515

## (undated) DEVICE — EYE CANN IRR 25GA HYDRODISSECTING 585037

## (undated) DEVICE — TAPE MICROPORE 2"X1.5YD 1530S-2

## (undated) DEVICE — EYE TIP IRRIGATION & ASPIRATION POLYMER 35D BENT 8065751511

## (undated) DEVICE — GLOVE PROTEXIS MICRO 6.0  2D73PM60

## (undated) DEVICE — EYE PACK BVI READYPAK KIT #2

## (undated) DEVICE — GLOVE PROTEXIS MICRO 6.5  2D73PM65

## (undated) DEVICE — EYE SOL BSS 500ML

## (undated) DEVICE — GLOVE PROTEXIS MICRO 7.0  2D73PM70

## (undated) DEVICE — EYE CANN IRR 30GA  ANTERIOR CHAMBER 581273

## (undated) DEVICE — EYE SHIELD PLASTIC

## (undated) DEVICE — EYE PACK CUSTOM ANTERIOR 30DEG TIP CENTURION PPK6682-04

## (undated) DEVICE — Device

## (undated) DEVICE — EYE MARKING PAD 581057

## (undated) DEVICE — EYE KNIFE SLIT XSTAR VISITEC 2.4MM 45DEG BEVEL UP 373724

## (undated) RX ORDER — TIMOLOL 5 MG/ML
SOLUTION/ DROPS OPHTHALMIC
Status: DISPENSED
Start: 2018-12-18

## (undated) RX ORDER — LIDOCAINE HYDROCHLORIDE 10 MG/ML
INJECTION, SOLUTION EPIDURAL; INFILTRATION; INTRACAUDAL; PERINEURAL
Status: DISPENSED
Start: 2018-12-18

## (undated) RX ORDER — ONDANSETRON 2 MG/ML
INJECTION INTRAMUSCULAR; INTRAVENOUS
Status: DISPENSED
Start: 2018-12-18

## (undated) RX ORDER — LIDOCAINE HYDROCHLORIDE 10 MG/ML
INJECTION, SOLUTION EPIDURAL; INFILTRATION; INTRACAUDAL; PERINEURAL
Status: DISPENSED
Start: 2018-04-24

## (undated) RX ORDER — TIMOLOL 5 MG/ML
SOLUTION/ DROPS OPHTHALMIC
Status: DISPENSED
Start: 2018-04-24

## (undated) RX ORDER — TETRACAINE HYDROCHLORIDE 5 MG/ML
SOLUTION OPHTHALMIC
Status: DISPENSED
Start: 2018-04-24